# Patient Record
(demographics unavailable — no encounter records)

---

## 2024-10-07 NOTE — REASON FOR VISIT
[Follow-Up Visit] : a follow-up visit for [[employer] ] : provided by [employer]   [FreeTextEntry2] : iron deficiency anemia [Interpreters_IDNumber] : [id number] [Interpreters_FullName] : Marco A  [TWNoteComboBox1] : Dutch

## 2024-10-07 NOTE — HISTORY OF PRESENT ILLNESS
[de-identified] : 44yo F [place] speaking here for management of iron deficiency anemia.\par  \par  She was found to have anemia to 9.8 when she saw her PMD in 2022, no prior labs to compare. Iron studies showed Fe 21, TIBC 446, TSAT 5%, ferritin 4. \par  She admits to heavy menstrual bleeding - uses at least 8 pads per day with clots. Periods will last about 4-5 days. She states periods are every month, regular. States periods have been heavier ever since her  3 years ago. Has not had any GYN follow up since. Has appointment w/ GYN for . \par  She reports having dizziness and fatigue. No CP or SOB.  \par  \par  Last time she had labs was 3 years ago. Was never told she was anemic before. Has never been on iron pills before. \par  \par  take 2 daily, now take only 1 , since felt headache after taking the 2nd pill, she reduced to 1 pill daily 2 weeks ago.  [de-identified] : Continues to have heavy menses. She saw GYN and had pelvic sono done and EMB done. She is taking birth control pills Incassia from GYN, admitted heavy period was slightly improved  but only slightly She had been taking FeSO4 BID, c/o nausea so she decreased to one tab daily since April -now c/o occasional dizziness so really has been taking it about every other day  She had endoscopy/colonoscopy Aug 31. H pylori positive, s/p triple therapy s/p Venofer x4 in 8/2022.  She notes having dizziness for the last few weeks so she is concerned she is anemic again today.   COVID Pfizer vaccine 1/8/22 and 2/19/22  She was seen today for a f/u apt, is on OCPs and agreed heavy bleeding slightly improved. LMP 2/14.  She was seen by IR for [place] in Apr but based on improvement in her AUB it was not recommended.  Her Hgb today is 9.3.   10/12:  Patient is seen today for a f/u apt. s/p Venofer x 6 7/27-8/23. She feels more energetic overall. She still has heavy period, changed pad 10 times a day, period lasted for 4 days; she had an IUD placed but self -expelled, she saw GYN last week had a new IUD Mirena placed.  she is out of voice today, c/o sore throat and cough with white mucus for 3 days, denied any fever, chills or SOB. Will f/u with PCP if symptom not improved.    1/8/24:  Patient is seen today for a f/u apt. She feels well overall. Denied any new complaints. She admitted had less menstrual bleeding since Mirena IUD placement on 19/6/23; had irregular period since then but admitted the menses were much less than before.  Pt didn't go to labs today before today's visit. Will send her back to lab afterwards.   4/1/24:  Patient is seen today for a f/u apt. She had irregular bleeding recently with large blood clots noticed, she intermittently felt dizzy. F/u with GYN 15 days ago, GYN checked and replaced her IUD. She was told the heavy bleeding may related with the disposition of IUD. She admitted her heavy bleeding was improved after IUD replacement. Denied any PICA.  Hgb today trending down to 11.3.    She received Venofer x6 April-May 2024. 7/10/24: Still heavy menses - uses about 8 pads/day on her 3 heavy days.   10/7:  Patient is seen today for a f/u apt accompanied by her mother. She feels well overall. Denied any new complaints.  She admitted her heavy menses were much improved after IUD placement.

## 2024-10-07 NOTE — ASSESSMENT
[FreeTextEntry1] : 47yo F Citizen of [place] speaking here for management of iron deficiency anemia.  Pt was started on oral iron supplementation in Feb - taking it about every other day secondary to side effects. Since pt is unable to tolerate oral iron, s/p IV Venofer x4 in 8/2022; Hgb improved after Venofer x 6 7/27-8/23. She received Venofer x6 again Apr-May 2024.   Hgb today is 12.5 further uptrending, CBC results reviewed with patient. will f/u iron study today, will set up Venofer if ferritin low again  cont f/u w/ GYN for management of menorrhagia- had IUD (Mirena) placed, she admitted heavy period improved now  All questions answered. RTC 4 mo  Case and management discussed with Dr. Mathew

## 2024-10-07 NOTE — HISTORY OF PRESENT ILLNESS
[de-identified] : 44yo F Barb[place] speaking here for management of iron deficiency anemia.\par  \par  She was found to have anemia to 9.8 when she saw her PMD in 2022, no prior labs to compare. Iron studies showed Fe 21, TIBC 446, TSAT 5%, ferritin 4. \par  She admits to heavy menstrual bleeding - uses at least 8 pads per day with clots. Periods will last about 4-5 days. She states periods are every month, regular. States periods have been heavier ever since her  3 years ago. Has not had any GYN follow up since. Has appointment w/ GYN for . \par  She reports having dizziness and fatigue. No CP or SOB.  \par  \par  Last time she had labs was 3 years ago. Was never told she was anemic before. Has never been on iron pills before. \par  \par  take 2 daily, now take only 1 , since felt headache after taking the 2nd pill, she reduced to 1 pill daily 2 weeks ago.  [de-identified] : Continues to have heavy menses. She saw GYN and had pelvic sono done and EMB done. She is taking birth control pills Incassia from GYN, admitted heavy period was slightly improved  but only slightly She had been taking FeSO4 BID, c/o nausea so she decreased to one tab daily since April -now c/o occasional dizziness so really has been taking it about every other day  She had endoscopy/colonoscopy Aug 31. H pylori positive, s/p triple therapy s/p Venofer x4 in 8/2022.  She notes having dizziness for the last few weeks so she is concerned she is anemic again today.   COVID Pfizer vaccine 1/8/22 and 2/19/22  She was seen today for a f/u apt, is on OCPs and agreed heavy bleeding slightly improved. LMP 2/14.  She was seen by IR for [place] in Apr but based on improvement in her AUB it was not recommended.  Her Hgb today is 9.3.   10/12:  Patient is seen today for a f/u apt. s/p Venofer x 6 7/27-8/23. She feels more energetic overall. She still has heavy period, changed pad 10 times a day, period lasted for 4 days; she had an IUD placed but self -expelled, she saw GYN last week had a new IUD Mirena placed.  she is out of voice today, c/o sore throat and cough with white mucus for 3 days, denied any fever, chills or SOB. Will f/u with PCP if symptom not improved.    1/8/24:  Patient is seen today for a f/u apt. She feels well overall. Denied any new complaints. She admitted had less menstrual bleeding since Mirena IUD placement on 19/6/23; had irregular period since then but admitted the menses were much less than before.  Pt didn't go to labs today before today's visit. Will send her back to lab afterwards.   4/1/24:  Patient is seen today for a f/u apt. She had irregular bleeding recently with large blood clots noticed, she intermittently felt dizzy. F/u with GYN 15 days ago, GYN checked and replaced her IUD. She was told the heavy bleeding may related with the disposition of IUD. She admitted her heavy bleeding was improved after IUD replacement. Denied any PICA.  Hgb today trending down to 11.3.    She received Venofer x6 April-May 2024. 7/10/24: Still heavy menses - uses about 8 pads/day on her 3 heavy days.   10/7:  Patient is seen today for a f/u apt accompanied by her mother. She feels well overall. Denied any new complaints.  She admitted her heavy menses were much improved after IUD placement.

## 2024-10-07 NOTE — REASON FOR VISIT
[Follow-Up Visit] : a follow-up visit for [[employer] ] : provided by [employer]   [FreeTextEntry2] : iron deficiency anemia [Interpreters_IDNumber] : [id number] [Interpreters_FullName] : Marco A  [TWNoteComboBox1] : Somali

## 2024-10-07 NOTE — ASSESSMENT
[FreeTextEntry1] : 47yo F Australian speaking here for management of iron deficiency anemia.  Pt was started on oral iron supplementation in Feb - taking it about every other day secondary to side effects. Since pt is unable to tolerate oral iron, s/p IV Venofer x4 in 8/2022; Hgb improved after Venofer x 6 7/27-8/23. She received Venofer x6 again Apr-May 2024.   Hgb today is 12.5 further uptrending, CBC results reviewed with patient. will f/u iron study today, will set up Venofer if ferritin low again  cont f/u w/ GYN for management of menorrhagia- had IUD (Mirena) placed, she admitted heavy period improved now  All questions answered. RTC 4 mo  Case and management discussed with Dr. Mathew

## 2025-02-26 NOTE — HISTORY OF PRESENT ILLNESS
[de-identified] : 44yo F Sudanese speaking here for management of iron deficiency anemia.\par  \par  She was found to have anemia to 9.8 when she saw her PMD in 2022, no prior labs to compare. Iron studies showed Fe 21, TIBC 446, TSAT 5%, ferritin 4. \par  She admits to heavy menstrual bleeding - uses at least 8 pads per day with clots. Periods will last about 4-5 days. She states periods are every month, regular. States periods have been heavier ever since her  3 years ago. Has not had any GYN follow up since. Has appointment w/ GYN for . \par  She reports having dizziness and fatigue. No CP or SOB.  \par  \par  Last time she had labs was 3 years ago. Was never told she was anemic before. Has never been on iron pills before. \par  \par  take 2 daily, now take only 1 , since felt headache after taking the 2nd pill, she reduced to 1 pill daily 2 weeks ago.  [de-identified] : Continues to have heavy menses. She saw GYN and had pelvic sono done and EMB done. She is taking birth control pills Incassia from GYN, admitted heavy period was slightly improved  but only slightly She had been taking FeSO4 BID, c/o nausea so she decreased to one tab daily since April -now c/o occasional dizziness so really has been taking it about every other day  She had endoscopy/colonoscopy Aug 31. H pylori positive, s/p triple therapy s/p Venofer x4 in 8/2022.  She notes having dizziness for the last few weeks so she is concerned she is anemic again today.   COVID Pfizer vaccine 1/8/22 and 2/19/22  She was seen today for a f/u apt, is on OCPs and agreed heavy bleeding slightly improved. LMP 2/14.  She was seen by IR for [place] in Apr but based on improvement in her AUB it was not recommended.  Her Hgb today is 9.3.   10/12:  Patient is seen today for a f/u apt. s/p Venofer x 6 7/27-8/23. She feels more energetic overall. She still has heavy period, changed pad 10 times a day, period lasted for 4 days; she had an IUD placed but self -expelled, she saw GYN last week had a new IUD Mirena placed.  she is out of voice today, c/o sore throat and cough with white mucus for 3 days, denied any fever, chills or SOB. Will f/u with PCP if symptom not improved.    1/8/24:  Patient is seen today for a f/u apt. She feels well overall. Denied any new complaints. She admitted had less menstrual bleeding since Mirena IUD placement on 19/6/23; had irregular period since then but admitted the menses were much less than before.  Pt didn't go to labs today before today's visit. Will send her back to lab afterwards.   4/1/24:  Patient is seen today for a f/u apt. She had irregular bleeding recently with large blood clots noticed, she intermittently felt dizzy. F/u with GYN 15 days ago, GYN checked and replaced her IUD. She was told the heavy bleeding may related with the disposition of IUD. She admitted her heavy bleeding was improved after IUD replacement. Denied any PICA.  Hgb today trending down to 11.3.    She received Venofer x6 April-May 2024. 7/10/24: Still heavy menses - uses about 8 pads/day on her 3 heavy days.   10/7:  Patient is seen today for a f/u apt accompanied by her mother. She feels well overall. Denied any new complaints.  She admitted her heavy menses were much improved after IUD placement.   2/26/25: Her Hgb dropped again to 9.8 today.  Still having heavy menses but not as strong. She notes feeling dizzy about 2 weeks ago before her LMP.

## 2025-02-26 NOTE — REASON FOR VISIT
[Follow-Up Visit] : a follow-up visit for [[employer] ] : provided by [employer]   [FreeTextEntry2] : iron deficiency anemia [Interpreters_IDNumber] : [id number] [Interpreters_FullName] : Brenda [TWNoteComboBox1] : [place]

## 2025-02-26 NOTE — ASSESSMENT
[FreeTextEntry1] : 47yo F [place] speaking here for management of iron deficiency anemia.  Pt was started on oral iron supplementation in Feb - taking it about every other day secondary to side effects. Since pt is unable to tolerate oral iron, s/p IV Venofer x4 in 8/2022; Hgb improved after Venofer x 6 7/27-8/23. She received Venofer x6 again Apr-May 2024.   Hgb today is 9.8 today. will f/u iron study but will set up Venofer as her ferritin likely low again cont f/u w/ GYN for management of menorrhagia- had IUD (Mirena) placed, she admitted heavy period improved now but still heavy She had colonoscopy about 2 years All questions answered. RTC 4 mo

## 2025-03-03 NOTE — HISTORY OF PRESENT ILLNESS
[FreeTextEntry1] : 48 F  with adenomyosis and TANG presents for annual follow up. Patient received pelvic U/S in 3/2024 which identified adenomyosis and subsequently as received Mirena IUD for menorrhagia. Patient reports some improvement in menstrual periods since then, but still states bleeding is heavy, with 3 days of 5-6 fully saturated pads and 2 days of 3-4 saturated pads every 28 days. She also endorses significant cramping associated with her menstrual cycle. LMP 3 days ago. She has been following with heme for resultant TANG, for which she is receiving iron transfusions. Patient not interested in surgical options at this time.   OB hx:  - FTCSx1, FTNSVDx2  GYN hx:  - Denies abnormal pap smears, cysts, or fibroids  - Endorses distant hx of unspecified STI, cannot recall year of dx  - Currently sexually active with one partner, , no condom use  - LMP 3 days ago. Endorsing heavy menstrual flow but regular cycles.   PMH:  - Iron-deficiency anemia   PSH:  -  2018, BTL   Medications: None   Allergies: NKDA   FMH: Maternal grandfather, stomach cancer   Social Hx:  - No reported smoking, alcohol, or other substance use  - Lives at home with spouse and children - No DV concerns, feels safe at home   Prevention:  - PHQ 3/25: 0  - Pap smear: , normal  - Mammogram, , BIRADS 1  - Colonoscopy, , 3mm polyp in proximal descending colon, resected, f/u 1 year

## 2025-03-03 NOTE — PHYSICAL EXAM
[Chaperone Present] : A chaperone was present in the examining room during all aspects of the physical examination [Appropriately responsive] : appropriately responsive [Alert] : alert [No Acute Distress] : no acute distress [No Lymphadenopathy] : no lymphadenopathy [Soft] : soft [Non-tender] : non-tender [Non-distended] : non-distended [No HSM] : No HSM [No Lesions] : no lesions [No Mass] : no mass [Oriented x3] : oriented x3 [Examination Of The Breasts] : a normal appearance [No Masses] : no breast masses were palpable [Labia Majora] : normal [Labia Minora] : normal [Scant] : There was scant vaginal bleeding [Normal] : normal [Uterine Adnexae] : normal [FreeTextEntry2] : MS3 [Normal Position] : in a normal position [FreeTextEntry5] : NO STRINGS VISIBLE

## 2025-03-03 NOTE — DISCUSSION/SUMMARY
[FreeTextEntry1] : 48 F  with adenomyosis and TANG presents for annual follow up. Patient received pelvic U/S in 3/2024 which identified adenomyosis and subsequently as received Mirena IUD for menorrhagia  #IUD follow up - Mirena strings not visualized on exam  - Pelvic u/s to assess IUD placement   #Adenomyosis - Discussed surgical treatment options with patient in office and long-term concerns of persistent TANG, patient not interested at this time   #Preventative  - f/u cultures sent today  - Pap smear due   - Provided referral for mammogram, patient states already scheduled for later this week - colonoscopy up to date  (f/u 1 yr) - Gen health followed by med/ heme  - PHQ 9- reviewed face to face, no signs of depression, will f/u for changes in mood/ anxiety  Will call w/ sono result (IUD confirmation)  RTC for yousif/prn.  Offered gyn surg consult for definitive tx of adenomyosis- pt refused Brii Hoyos, MS3 Leonard, PAC

## 2025-03-03 NOTE — REASON FOR VISIT
[Annual] : an annual visit. [[employer] ] : provided by [employer]   [Interpreters_IDNumber] : [id number] [TWNoteComboBox1] : [place]

## 2025-05-22 NOTE — END OF VISIT
[] : Fellow [FreeTextEntry3] : I agree with the above assessment and plan. Pt willing to get Mirena iUD. discused risks of hysteroscopy including but not limited to VTE, SSI uterine perforation, fluid overload, need for laparoscopy, laparotomy. discussed bleeding profile with IUD. Does not want definitive managment at this time Ame Teran MD

## 2025-05-22 NOTE — REVIEW OF SYSTEMS
[Fatigue] : fatigue [Abn Vaginal bleeding] : abnormal vaginal bleeding [Pelvic pain] : pelvic pain [History of Anemia] : history of anemia [Negative] : Endocrine [Fever] : no fever [Nose Bleeds] : no nose bleeds

## 2025-05-22 NOTE — PLAN
[FreeTextEntry1] : Patient is a 50yo  LMP 5/5 presenting to GYN booking clinic for initial consultation for AUB, likely secondary to endometrial polyp. Patient has been previously managed with Mirena IUDs, with first placed in  but removed due to discomfort and second placed in  though spontaneously dislodged. Patient now interested in surgical management.  Discussed D&C/endometrial polypectomy versus hysterectomy. Patient interested in lesser invasive option of D&C/polypectomy. Discussed vaginal route of surgery with no abdominal incisions. Pt counseled on risks of D&C/polypectomy such as bleeding, infection, injury to surrounding structures including uterine perforation. Pt expressed understanding and wishes to proceed with surgical booking. Discussed recommendation for Mirena IUD placement due to patient's improvement in symptoms when not malpositioned or dislodged. Patient in agreement with Mirena placement at time of D&C/polypectomy. No medical clearances indicated.  Seen and counseled with attending, Dr. Teran and [facility] fellow, Dr. Ronna Blevins PGY3  [facility] Fellow Addendum:  48 yo  presenting for consultation regarding AUB-P,A.. She has had two failed IUDs, the most recent of which was expelled (not seen on SHG or x-ray). On imaging to locate the IUD, a fundal polyp measuring ~2cm was visualized. Uterus measuring 11cm with adenomyosis. PMSH significant for TANG, CS with BTL. Discussed options with patient including medical therapies and surgery including hysteroscopy with polypectomy vs. hysterectomy. At this time patient opts for conserved surgical management with D&C, hsc polypectomy, LNG-IUD placement. R/b/a intra and postoperative considerations reviewed. All questions answered.  Mony Friend MD [facility], PGY-6

## 2025-05-22 NOTE — HISTORY OF PRESENT ILLNESS
[FreeTextEntry1] : Patient is a 48yo  LMP  presenting to GYN booking clinic for initial consultation for AUB. Reports regular intervals of menses with heavy flow on day 2, for the past several years. Uses 8-10 heavy duty pads when flow heaviest, associated with sxs of anemia including dizziness and fatigue. Has undergone iron infusions x18 (3 sessions of 6 infusions) with most recent being 1 month ago.  AUB previously managed with Mirena IUD: - 10/3/23 -> removed due to pain from misplacement. - 3/18/2024 -> replaced, but has since spontaneously dislodged.  Review of recent imaging:  TVUS (3/6/25): Uterus: 11.4 cm x 6.9 cm x 8.2 cm. there is heterogeneous endometrial parenchyma likely reflecting adenomyomatosis. Heterogeneous endometrium with questionable polyp measuring up to 14 mm.  Sonohysterogram (25): There is a single pedunculated fundal polyp demonstrated which measures 1.5 x 1.0 x 1.8 cm. Endometrium is otherwise unremarkable. KUB (25): No radiographic evidence of abdominal/pelvic IUD.  OB hx: FT C/S x1, FT  x2 GYN hx: - Denies abnormal pap smears, cysts, or fibroids - Endorses distant hx of unspecified STI, cannot recall year of dx - Currently sexually active with one partner, , no condom use - LMP 25, 5 days in duration - saturates 8-10 heavy duty pads - Pap smear: , normal  PMH: Iron-deficiency anemia Medications: Fe infusions Allergies: NKDA PSH:  2018, BTL Social Hx: No reported smoking, alcohol, or other substance use

## 2025-05-22 NOTE — REASON FOR VISIT
[Initial] : an initial consultation for [[employer] ] : provided by [employer]   [Interpreters_IDNumber] : [id number] [Interpreters_FullName] : Denis [TWNoteComboBox1] : Citizen of [place]

## 2025-05-22 NOTE — PHYSICAL EXAM
[Appropriately responsive] : appropriately responsive [Alert] : alert [No Acute Distress] : no acute distress [Oriented x3] : oriented x3 [FreeTextEntry4] : extremities well perfused [FreeTextEntry5] : nmormal respiratory effort on room air

## 2025-06-19 NOTE — HISTORY OF PRESENT ILLNESS
[0/10] : no pain reported [Fever] : no fever [Chills] : no chills [Nausea] : no nausea [Vomiting] : no vomiting [Diarrhea] : no diarrhea [Vaginal Bleeding] : no vaginal bleeding [Pelvic Pressure] : no pelvic pressure [Dysuria] : no dysuria [Vaginal Discharge] : no vaginal discharge [Constipation] : no constipation [Normal] : the vagina was normal [Doing Well] : is doing well [No Sign of Infection] : is showing no signs of infection [None] : None [de-identified] : iud strings visulaized protruding from cervical os [de-identified] : s/p D&C hysteroscopy [de-identified] : rtc in May 2026 for annual visit

## 2025-06-19 NOTE — CHIEF COMPLAINT
[Post Op Day: ___] : post op day #[unfilled] [Procedure: ___] : status post [unfilled] [Indication: ___] : for [unfilled] [Parent] : parent

## 2025-07-14 NOTE — HISTORY OF PRESENT ILLNESS
[de-identified] : 46yo F [place] speaking here for management of iron deficiency anemia.\par  \par  She was found to have anemia to 9.8 when she saw her PMD in 2022, no prior labs to compare. Iron studies showed Fe 21, TIBC 446, TSAT 5%, ferritin 4. \par  She admits to heavy menstrual bleeding - uses at least 8 pads per day with clots. Periods will last about 4-5 days. She states periods are every month, regular. States periods have been heavier ever since her  3 years ago. Has not had any GYN follow up since. Has appointment w/ GYN for . \par  She reports having dizziness and fatigue. No CP or SOB.  \par  \par  Last time she had labs was 3 years ago. Was never told she was anemic before. Has never been on iron pills before. \par  \par  take 2 daily, now take only 1 , since felt headache after taking the 2nd pill, she reduced to 1 pill daily 2 weeks ago.  [de-identified] : Continues to have heavy menses. She saw GYN and had pelvic sono done and EMB done. She is taking birth control pills Incassia from GYN, admitted heavy period was slightly improved  but only slightly She had been taking FeSO4 BID, c/o nausea so she decreased to one tab daily since April -now c/o occasional dizziness so really has been taking it about every other day  She had endoscopy/colonoscopy Aug 31. H pylori positive, s/p triple therapy s/p Venofer x4 in 8/2022.  She notes having dizziness for the last few weeks so she is concerned she is anemic again today.   COVID Pfizer vaccine 1/8/22 and 2/19/22  She was seen today for a f/u apt, is on OCPs and agreed heavy bleeding slightly improved. LMP 2/14.  She was seen by IR for [place] in Apr but based on improvement in her AUB it was not recommended.  Her Hgb today is 9.3.   10/12:  Patient is seen today for a f/u apt. s/p Venofer x 6 7/27-8/23. She feels more energetic overall. She still has heavy period, changed pad 10 times a day, period lasted for 4 days; she had an IUD placed but self -expelled, she saw GYN last week had a new IUD Mirena placed.  she is out of voice today, c/o sore throat and cough with white mucus for 3 days, denied any fever, chills or SOB. Will f/u with PCP if symptom not improved.    1/8/24:  Patient is seen today for a f/u apt. She feels well overall. Denied any new complaints. She admitted had less menstrual bleeding since Mirena IUD placement on 19/6/23; had irregular period since then but admitted the menses were much less than before.  Pt didn't go to labs today before today's visit. Will send her back to lab afterwards.   4/1/24:  Patient is seen today for a f/u apt. She had irregular bleeding recently with large blood clots noticed, she intermittently felt dizzy. F/u with GYN 15 days ago, GYN checked and replaced her IUD. She was told the heavy bleeding may related with the disposition of IUD. She admitted her heavy bleeding was improved after IUD replacement. Denied any PICA.  Hgb today trending down to 11.3.    She received Venofer x6 April-May 2024. 7/10/24: Still heavy menses - uses about 8 pads/day on her 3 heavy days.   10/7:  Patient is seen today for a f/u apt accompanied by her mother. She feels well overall. Denied any new complaints.  She admitted her heavy menses were much improved after IUD placement.   2/26/25: Her Hgb dropped again to 9.8 today.  Still having heavy menses but not as strong. She notes feeling dizzy about 2 weeks ago before her LMP.   7/14/25: s/p Venofer weekly x6 in March-Apr 2025.  s/p D&C hysteroscopy for endometrial polyp and Mirena IUD inseriton on 6/6/25.

## 2025-07-14 NOTE — REASON FOR VISIT
[Follow-Up Visit] : a follow-up visit for [Language Line ] : provided by [employer]   [FreeTextEntry2] : iron deficiency anemia [Interpreters_IDNumber] : [id number] [Interpreters_FullName] : Brenda [TWNoteComboBox1] : Ethiopian